# Patient Record
(demographics unavailable — no encounter records)

---

## 2024-10-16 NOTE — ASSESSMENT
[FreeTextEntry1] : Elevated LFTs - Will obtain records from PCP. - Check liver serologies and fibroscan.  -  Reviewed stages of liver disease. Reviewed lifestyle modifications to prevent further disease accumulation. BMI in overweight range, encouraged slow and steady weight loss. Can be reversed with strict healthy diet and regular exercise.

## 2024-10-16 NOTE — HISTORY OF PRESENT ILLNESS
[FreeTextEntry1] : DATE OF PROCEDURE:  08/15/2019 Dr. Thompson PROCEDURE:  Colonoscopy.   INDICATION FOR PROCEDURE: Screening - first colonoscopy, no risk factors, so this was an average risk patient.  She is a 58, but had not previously undergone colonoscopy.   Dr. Augustin administered IV propofol as anesthesia.   The PCF colonoscope was advanced uneventfully and without any difficulty to the cecum.   Bowel preparation was excellent.   Visualization of the cecum, the rectum and the entire colon was excellent.   FINDINGS:  Normal colonoscopy, no polyps or other abnormalities, no other abnormal findings.   RECOMMENDATIONS:  Followup colonoscopy recommended because of the family history of skin cancer, breast cancer and lung cancer - in 5-7 years. [de-identified] : Exam Date: 	  08/21/24					 Exam: 	US ABDOMEN LIMITED					 Order#:	[id number]					                CLINICAL INFORMATION: elevation of liver transaminase levels  COMPARISON: None available.  TECHNIQUE: Sonography of the right upper quadrant.   FINDINGS:  Liver: Upper normal in size. Mild patchy diffuse  increased parenchymal echogenicity. No focal mass. Bile ducts: Normal in caliber. Common bile duct measures 4.7 mm.  Gallbladder: No intraluminal calculi, sludge, wall thickening, pericholecystic fluid, or tenderness to palpation Pancreas: Visualized portions are grossly unremarkable.. Right kidney: 10.6 cm in length. No hydronephrosis. Ascites: None. IVC: Visualized portions are within normal limits.    IMPRESSION: Upper normal-sized liver with mild patchy diffuse increased parenchymal echogenicity. Differential diagnosis would include fatty infiltration and diffuse hepatocellular disease.

## 2025-06-10 NOTE — CARDIOLOGY SUMMARY
[de-identified] : SB HR 56bpm [de-identified] : Exercise stress Echo 10/2023: Sinus lisa at rest.  Sinus tach without abnormality at peak exercise.  Normal rest echo wall motion with appropriate increase in contractility post exercise.

## 2025-06-10 NOTE — ADDENDUM
[FreeTextEntry1] : I, Jolene Vang, am scribing for and the presence of Dr. Garrett the following sections: HPI, PMH,Family/social history, ROS, Physical Exam, Assessment / Plan.  I, Kevin Garrett, personally performed the services described in the documentation, reviewed the documentation recorded by the scribe in my presence and it accurately and completely records my words and actions.

## 2025-06-10 NOTE — CARDIOLOGY SUMMARY
[de-identified] : SB HR 56bpm [de-identified] : Exercise stress Echo 10/2023: Sinus lisa at rest.  Sinus tach without abnormality at peak exercise.  Normal rest echo wall motion with appropriate increase in contractility post exercise.

## 2025-06-10 NOTE — PHYSICAL EXAM
[Well Developed] : well developed [No Acute Distress] : no acute distress [Normal S1, S2] : normal S1, S2 [No Murmur] : no murmur [Clear Lung Fields] : clear lung fields [Soft] : abdomen soft [No Edema] : no edema [No Rash] : no rash [Moves all extremities] : moves all extremities [Alert and Oriented] : alert and oriented

## 2025-06-10 NOTE — HISTORY OF PRESENT ILLNESS
[FreeTextEntry1] : 63yo F, PMHx of hyperlipidemia, paroxysmal AF (on propafenone).  She was diagnosed with Afib in 2019 and initiated on Propafenone in 2020.  Since on Propafenone she reports AF events about once per year lasting ~4hours prior to converting to SR.  More recently she had AF in March and again in May which was longer and required an ED visit to [facility].  EKG showing AF with RVR 125bpm.  She converted to SR prior to discharge.  She has palpitations and fast HR while in AF.  She otherwise feels okay at baseline with a stable exercise tolerance  She works as a .